# Patient Record
Sex: FEMALE | Race: OTHER | HISPANIC OR LATINO
[De-identification: names, ages, dates, MRNs, and addresses within clinical notes are randomized per-mention and may not be internally consistent; named-entity substitution may affect disease eponyms.]

---

## 2022-09-14 ENCOUNTER — APPOINTMENT (OUTPATIENT)
Dept: OPHTHALMOLOGY | Facility: CLINIC | Age: 52
End: 2022-09-14

## 2022-09-14 ENCOUNTER — NON-APPOINTMENT (OUTPATIENT)
Age: 52
End: 2022-09-14

## 2022-09-14 PROCEDURE — 92250 FUNDUS PHOTOGRAPHY W/I&R: CPT

## 2022-09-14 PROCEDURE — 92004 COMPRE OPH EXAM NEW PT 1/>: CPT

## 2022-09-14 PROCEDURE — 92136 OPHTHALMIC BIOMETRY: CPT

## 2022-09-14 PROCEDURE — 92025 CPTRIZED CORNEAL TOPOGRAPHY: CPT

## 2023-01-17 ENCOUNTER — APPOINTMENT (OUTPATIENT)
Dept: OPHTHALMOLOGY | Facility: CLINIC | Age: 53
End: 2023-01-17

## 2023-01-26 NOTE — ASU PATIENT PROFILE, ADULT - FALL HARM RISK - HARM RISK INTERVENTIONS

## 2023-01-26 NOTE — ASU PATIENT PROFILE, ADULT - NS PREOP UNDERSTANDS INFO
spoke to patient to be NPO /No solid foods after 2000 tonight , allow to drink water till 12Mn.  take meds with sip of water in am. dress comfortable. bring ID photo, insurance and vaccination cards, escort arranged. address and telephone given to patient/yes

## 2023-01-26 NOTE — ASU PATIENT PROFILE, ADULT - MEDICATIONS TO TAKE
Hydrochlorotiazide 25 mg, amlodipine 20 mg , patient asked to bring  proventil inhaler  day of surgery

## 2023-01-27 ENCOUNTER — OUTPATIENT (OUTPATIENT)
Dept: OUTPATIENT SERVICES | Facility: HOSPITAL | Age: 53
LOS: 1 days | Discharge: HOME | End: 2023-01-27
Payer: COMMERCIAL

## 2023-01-27 ENCOUNTER — APPOINTMENT (OUTPATIENT)
Dept: OPHTHALMOLOGY | Facility: AMBULATORY SURGERY CENTER | Age: 53
End: 2023-01-27

## 2023-01-27 VITALS
SYSTOLIC BLOOD PRESSURE: 140 MMHG | OXYGEN SATURATION: 97 % | HEART RATE: 70 BPM | RESPIRATION RATE: 13 BRPM | DIASTOLIC BLOOD PRESSURE: 88 MMHG

## 2023-01-27 VITALS
DIASTOLIC BLOOD PRESSURE: 75 MMHG | WEIGHT: 166.23 LBS | TEMPERATURE: 98 F | RESPIRATION RATE: 15 BRPM | HEIGHT: 60 IN | OXYGEN SATURATION: 98 % | HEART RATE: 73 BPM | SYSTOLIC BLOOD PRESSURE: 126 MMHG

## 2023-01-27 DIAGNOSIS — Z96.652 PRESENCE OF LEFT ARTIFICIAL KNEE JOINT: Chronic | ICD-10-CM

## 2023-01-27 PROCEDURE — 66984 XCAPSL CTRC RMVL W/O ECP: CPT | Mod: RT

## 2023-01-27 DEVICE — LENS IOL AKREOS MI60LUS 20.5D
Type: IMPLANTABLE DEVICE | Status: NON-FUNCTIONAL
Removed: 2023-01-27

## 2023-01-27 RX ORDER — ATORVASTATIN CALCIUM 80 MG/1
1 TABLET, FILM COATED ORAL
Qty: 0 | Refills: 0 | DISCHARGE

## 2023-01-27 RX ORDER — TROPICAMIDE 1 %
1 DROPS OPHTHALMIC (EYE)
Refills: 0 | Status: COMPLETED | OUTPATIENT
Start: 2023-01-27 | End: 2023-01-27

## 2023-01-27 RX ORDER — TOBRAMYCIN 0.3 %
1 DROPS OPHTHALMIC (EYE)
Refills: 0 | Status: COMPLETED | OUTPATIENT
Start: 2023-01-27 | End: 2023-01-27

## 2023-01-27 RX ORDER — AMLODIPINE BESYLATE 2.5 MG/1
1 TABLET ORAL
Qty: 0 | Refills: 0 | DISCHARGE

## 2023-01-27 RX ORDER — PHENYLEPHRINE HCL 2.5 %
1 DROPS OPHTHALMIC (EYE)
Refills: 0 | Status: COMPLETED | OUTPATIENT
Start: 2023-01-27 | End: 2023-01-27

## 2023-01-27 RX ORDER — HYDROCHLOROTHIAZIDE 25 MG
1 TABLET ORAL
Qty: 0 | Refills: 0 | DISCHARGE

## 2023-01-27 RX ORDER — OFLOXACIN 0.3 %
1 DROPS OPHTHALMIC (EYE)
Refills: 0 | Status: DISCONTINUED | OUTPATIENT
Start: 2023-01-27 | End: 2023-01-27

## 2023-01-27 RX ORDER — ALBUTEROL 90 UG/1
2 AEROSOL, METERED ORAL
Qty: 0 | Refills: 0 | DISCHARGE

## 2023-01-27 RX ADMIN — Medication 1 DROP(S): at 07:39

## 2023-01-27 RX ADMIN — Medication 1 DROP(S): at 07:46

## 2023-01-27 RX ADMIN — Medication 1 DROP(S): at 07:30

## 2023-01-27 RX ADMIN — Medication 1 DROP(S): at 07:31

## 2023-01-27 RX ADMIN — Medication 1 DROP(S): at 07:45

## 2023-01-27 RX ADMIN — Medication 1 DROP(S): at 07:54

## 2023-01-27 RX ADMIN — Medication 1 DROP(S): at 07:51

## 2023-01-27 NOTE — PRE-ANESTHESIA EVALUATION ADULT - NSANTHOSAYNRD_GEN_A_CORE
No. KENDRICK screening performed.  STOP BANG Legend: 0-2 = LOW Risk; 3-4 = INTERMEDIATE Risk; 5-8 = HIGH Risk

## 2023-01-27 NOTE — OPERATIVE REPORT - OPERATIVE RPOSRT DETAILS
PREOPERATIVE DIAGNOSIS: Dense CATARACT RIGHT EYE    POSTOPERATIVE DIAGNOSIS: Dense CATARACT RIGHT EYE    OPERATIVE PROCEDURE: Complex PHACOEMULSIFICATION CATARACT EXTRACTION WITH POSTERIOR CHAMBER INTRAOCULAR LENS INSERTION ,RIGHT EYE    LENS USED: MI60L 20.5D    LENS POWER:     PROCEDURE:	  The patient was brought into the operating room and placed supine on the operating room table. After uneventful induction of neuroleptic anesthesia, additional lidocaine gel was instilled into the operative eye achieving sufficient anesthesia. The patient was then prepped and draped in the usual sterile fashion. A wire lid speculum was then inserted into the operative eye giving a wide palpebral fissure.    A merlin knife was used to perform a paracentesis through clear cornea at the 10 o'clock limbal position. The anterior chamber was irrigated with lidocaine 1% non-preserved. Air followed by trypan was instilled in the AC. Viscoelastic was then used to maintain the anterior chamber. A keratome was used to create a clear corneal incision just inside the temporal limbus. A cystotome was inserted through the main corneal wound and used to create an anterior lens capsular leaflet. Utrata forceps were then inserted through the main wound and used to create a continuous, curvilinear capsulorrhexis. Balanced salt solution was used to hydrodissect the nucleus. The phacoemulsification unit was then used to completely phacoemulsify the nucleus, following which an aspirating hand piece was used to aspirate all cortical remnants from the capsular bag. Viscoelastic was again used to reform the anterior chamber and capsular bag.    A new foldable posterior chamber intraocular lens was brought into the surgical field. It was folded and directly injected into the capsular bag following which it was centered and secure. Viscoelastic was removed with irrigation/aspiration. All wounds were hydrated and found to be watertight. The wounds remained watertight. An antibiotic/steroid mixture was irrigated into the conjunctival cul de sac. The lid speculum was removed from the eye and a shield placed over the eye. The patient tolerated the procedure well and went to the recovery area in good condition.

## 2023-01-28 ENCOUNTER — NON-APPOINTMENT (OUTPATIENT)
Age: 53
End: 2023-01-28

## 2023-01-28 ENCOUNTER — APPOINTMENT (OUTPATIENT)
Dept: OPHTHALMOLOGY | Facility: CLINIC | Age: 53
End: 2023-01-28
Payer: COMMERCIAL

## 2023-01-28 PROCEDURE — 99024 POSTOP FOLLOW-UP VISIT: CPT

## 2023-02-03 ENCOUNTER — NON-APPOINTMENT (OUTPATIENT)
Age: 53
End: 2023-02-03

## 2023-02-03 ENCOUNTER — APPOINTMENT (OUTPATIENT)
Dept: OPHTHALMOLOGY | Facility: CLINIC | Age: 53
End: 2023-02-03
Payer: COMMERCIAL

## 2023-02-03 PROBLEM — Z00.00 ENCOUNTER FOR PREVENTIVE HEALTH EXAMINATION: Status: ACTIVE | Noted: 2023-02-03

## 2023-02-03 PROCEDURE — 99024 POSTOP FOLLOW-UP VISIT: CPT

## 2023-02-17 PROBLEM — J45.909 UNSPECIFIED ASTHMA, UNCOMPLICATED: Chronic | Status: ACTIVE | Noted: 2023-01-26

## 2023-02-17 PROBLEM — E78.5 HYPERLIPIDEMIA, UNSPECIFIED: Chronic | Status: ACTIVE | Noted: 2023-01-26

## 2023-02-17 PROBLEM — I10 ESSENTIAL (PRIMARY) HYPERTENSION: Chronic | Status: ACTIVE | Noted: 2023-01-26

## 2023-02-24 ENCOUNTER — APPOINTMENT (OUTPATIENT)
Dept: OPHTHALMOLOGY | Facility: CLINIC | Age: 53
End: 2023-02-24

## 2023-03-24 ENCOUNTER — APPOINTMENT (OUTPATIENT)
Dept: OPHTHALMOLOGY | Facility: CLINIC | Age: 53
End: 2023-03-24

## (undated) DEVICE — SOL IRR BAL SALT 500ML

## (undated) DEVICE — GLV 7.5 PROTEXIS (WHITE)

## (undated) DEVICE — NDL RETROBULBAR VISITEC 25X1.5

## (undated) DEVICE — CAPSULE GUARD I/A

## (undated) DEVICE — KNIFE ALCON PARACENTESIS CLEARCUT SIDEPORT 1MM (YELLOW)

## (undated) DEVICE — DRAPE MICROSCOPE KNOB COVER SMALL (2 PCS)

## (undated) DEVICE — APPLICATOR COTTON TIP 6"

## (undated) DEVICE — WARMING BLANKET LOWER ADULT

## (undated) DEVICE — CATARACT KIT

## (undated) DEVICE — KIT CENTURION ANTERIOR

## (undated) DEVICE — NDL HYPO SAFE 25G X 5/8" (ORANGE)

## (undated) DEVICE — DRSG CURITY GAUZE SPONGE 4 X 4" 12-PLY

## (undated) DEVICE — VENODYNE/SCD SLEEVE CALF MEDIUM

## (undated) DEVICE — SUT NYLON 10-0 12" CU-5

## (undated) DEVICE — PACK ANTERIOR SEGMENT